# Patient Record
Sex: MALE | Race: WHITE | NOT HISPANIC OR LATINO | Employment: FULL TIME | URBAN - METROPOLITAN AREA
[De-identification: names, ages, dates, MRNs, and addresses within clinical notes are randomized per-mention and may not be internally consistent; named-entity substitution may affect disease eponyms.]

---

## 2020-07-14 ENCOUNTER — TELEPHONE (OUTPATIENT)
Dept: ORTHOPEDIC SURGERY | Facility: CLINIC | Age: 35
End: 2020-07-14

## 2020-07-16 ENCOUNTER — OFFICE VISIT (OUTPATIENT)
Dept: ORTHOPEDIC SURGERY | Facility: CLINIC | Age: 35
End: 2020-07-16

## 2020-07-16 VITALS — BODY MASS INDEX: 37.22 KG/M2 | WEIGHT: 260 LBS | TEMPERATURE: 98.7 F | HEIGHT: 70 IN

## 2020-07-16 DIAGNOSIS — S86.311A PERONEAL TENDON TEAR, RIGHT, INITIAL ENCOUNTER: ICD-10-CM

## 2020-07-16 DIAGNOSIS — M25.571 RIGHT ANKLE PAIN, UNSPECIFIED CHRONICITY: ICD-10-CM

## 2020-07-16 DIAGNOSIS — S93.401A SPRAIN OF RIGHT ANKLE, UNSPECIFIED LIGAMENT, INITIAL ENCOUNTER: Primary | ICD-10-CM

## 2020-07-16 PROCEDURE — 99244 OFF/OP CNSLTJ NEW/EST MOD 40: CPT | Performed by: ORTHOPAEDIC SURGERY

## 2020-07-16 PROCEDURE — 73610 X-RAY EXAM OF ANKLE: CPT | Performed by: ORTHOPAEDIC SURGERY

## 2020-07-16 RX ORDER — DICLOFENAC SODIUM 75 MG/1
75 TABLET, DELAYED RELEASE ORAL 2 TIMES DAILY
Qty: 60 TABLET | Refills: 1 | Status: SHIPPED | OUTPATIENT
Start: 2020-07-16 | End: 2020-08-06

## 2020-08-06 ENCOUNTER — OFFICE VISIT (OUTPATIENT)
Dept: ORTHOPEDIC SURGERY | Facility: CLINIC | Age: 35
End: 2020-08-06

## 2020-08-06 VITALS — BODY MASS INDEX: 37.28 KG/M2 | HEIGHT: 70 IN | TEMPERATURE: 97.6 F | WEIGHT: 260.4 LBS

## 2020-08-06 DIAGNOSIS — S93.401D SPRAIN OF RIGHT ANKLE, UNSPECIFIED LIGAMENT, SUBSEQUENT ENCOUNTER: ICD-10-CM

## 2020-08-06 DIAGNOSIS — S86.311D PERONEAL TENDON TEAR, RIGHT, SUBSEQUENT ENCOUNTER: Primary | ICD-10-CM

## 2020-08-06 DIAGNOSIS — M65.9 TENOSYNOVITIS OF RIGHT ANKLE: ICD-10-CM

## 2020-08-06 PROCEDURE — 99213 OFFICE O/P EST LOW 20 MIN: CPT | Performed by: ORTHOPAEDIC SURGERY

## 2020-08-27 ENCOUNTER — OFFICE VISIT (OUTPATIENT)
Dept: ORTHOPEDIC SURGERY | Facility: CLINIC | Age: 35
End: 2020-08-27

## 2020-08-27 VITALS — TEMPERATURE: 98.1 F | BODY MASS INDEX: 37.22 KG/M2 | WEIGHT: 260 LBS | HEIGHT: 70 IN

## 2020-08-27 DIAGNOSIS — S93.401D SPRAIN OF RIGHT ANKLE, UNSPECIFIED LIGAMENT, SUBSEQUENT ENCOUNTER: ICD-10-CM

## 2020-08-27 DIAGNOSIS — M65.9 TENOSYNOVITIS OF RIGHT ANKLE: ICD-10-CM

## 2020-08-27 DIAGNOSIS — S86.311D PERONEAL TENDON TEAR, RIGHT, SUBSEQUENT ENCOUNTER: Primary | ICD-10-CM

## 2020-08-27 PROCEDURE — 99213 OFFICE O/P EST LOW 20 MIN: CPT | Performed by: ORTHOPAEDIC SURGERY

## 2020-09-28 ENCOUNTER — OFFICE VISIT (OUTPATIENT)
Dept: ORTHOPEDIC SURGERY | Facility: CLINIC | Age: 35
End: 2020-09-28

## 2020-09-28 VITALS — HEIGHT: 70 IN | BODY MASS INDEX: 37.22 KG/M2 | WEIGHT: 260 LBS | TEMPERATURE: 97.5 F

## 2020-09-28 DIAGNOSIS — M65.9 TENOSYNOVITIS OF RIGHT ANKLE: ICD-10-CM

## 2020-09-28 DIAGNOSIS — S86.311D PERONEAL TENDON TEAR, RIGHT, SUBSEQUENT ENCOUNTER: Primary | ICD-10-CM

## 2020-09-28 DIAGNOSIS — S93.401D SPRAIN OF RIGHT ANKLE, UNSPECIFIED LIGAMENT, SUBSEQUENT ENCOUNTER: ICD-10-CM

## 2020-09-28 PROBLEM — M65.971 TENOSYNOVITIS OF RIGHT ANKLE: Status: ACTIVE | Noted: 2020-09-28

## 2020-09-28 PROCEDURE — 99213 OFFICE O/P EST LOW 20 MIN: CPT | Performed by: ORTHOPAEDIC SURGERY

## 2024-11-17 ENCOUNTER — HOSPITAL ENCOUNTER (EMERGENCY)
Facility: HOSPITAL | Age: 39
Discharge: HOME | End: 2024-11-17
Attending: EMERGENCY MEDICINE | Admitting: EMERGENCY MEDICINE
Payer: COMMERCIAL

## 2024-11-17 ENCOUNTER — APPOINTMENT (OUTPATIENT)
Dept: GENERAL RADIOLOGY | Facility: HOSPITAL | Age: 39
End: 2024-11-17
Payer: COMMERCIAL

## 2024-11-17 VITALS
WEIGHT: 287.26 LBS | DIASTOLIC BLOOD PRESSURE: 70 MMHG | TEMPERATURE: 98.1 F | HEART RATE: 77 BPM | HEIGHT: 70 IN | BODY MASS INDEX: 41.12 KG/M2 | OXYGEN SATURATION: 92 % | RESPIRATION RATE: 18 BRPM | SYSTOLIC BLOOD PRESSURE: 118 MMHG

## 2024-11-17 DIAGNOSIS — R07.9 NONSPECIFIC CHEST PAIN: Primary | ICD-10-CM

## 2024-11-17 DIAGNOSIS — R63.5 ABNORMAL WEIGHT GAIN: ICD-10-CM

## 2024-11-17 DIAGNOSIS — R10.13 EPIGASTRIC ABDOMINAL PAIN: ICD-10-CM

## 2024-11-17 LAB
ALBUMIN SERPL-MCNC: 4.3 G/DL (ref 3.5–5.2)
ALBUMIN/GLOB SERPL: 1.3 G/DL
ALP SERPL-CCNC: 53 U/L (ref 39–117)
ALT SERPL W P-5'-P-CCNC: 84 U/L (ref 1–41)
ANION GAP SERPL CALCULATED.3IONS-SCNC: 11.3 MMOL/L (ref 5–15)
AST SERPL-CCNC: 72 U/L (ref 1–40)
BASOPHILS # BLD AUTO: 0.05 10*3/MM3 (ref 0–0.2)
BASOPHILS NFR BLD AUTO: 0.6 % (ref 0–1.5)
BILIRUB SERPL-MCNC: 0.5 MG/DL (ref 0–1.2)
BUN SERPL-MCNC: 17 MG/DL (ref 6–20)
BUN/CREAT SERPL: 15.7 (ref 7–25)
CALCIUM SPEC-SCNC: 8.8 MG/DL (ref 8.6–10.5)
CHLORIDE SERPL-SCNC: 97 MMOL/L (ref 98–107)
CO2 SERPL-SCNC: 26.7 MMOL/L (ref 22–29)
CREAT SERPL-MCNC: 1.08 MG/DL (ref 0.76–1.27)
DEPRECATED RDW RBC AUTO: 41.3 FL (ref 37–54)
EGFRCR SERPLBLD CKD-EPI 2021: 89 ML/MIN/1.73
EOSINOPHIL # BLD AUTO: 0.07 10*3/MM3 (ref 0–0.4)
EOSINOPHIL NFR BLD AUTO: 0.8 % (ref 0.3–6.2)
ERYTHROCYTE [DISTWIDTH] IN BLOOD BY AUTOMATED COUNT: 13.4 % (ref 12.3–15.4)
FLUAV SUBTYP SPEC NAA+PROBE: NOT DETECTED
FLUBV RNA ISLT QL NAA+PROBE: NOT DETECTED
GLOBULIN UR ELPH-MCNC: 3.2 GM/DL
GLUCOSE SERPL-MCNC: 86 MG/DL (ref 65–99)
HCT VFR BLD AUTO: 44.3 % (ref 37.5–51)
HGB BLD-MCNC: 13.9 G/DL (ref 13–17.7)
HOLD SPECIMEN: NORMAL
HOLD SPECIMEN: NORMAL
IMM GRANULOCYTES # BLD AUTO: 0.1 10*3/MM3 (ref 0–0.05)
IMM GRANULOCYTES NFR BLD AUTO: 1.2 % (ref 0–0.5)
LIPASE SERPL-CCNC: 67 U/L (ref 13–60)
LYMPHOCYTES # BLD AUTO: 1.11 10*3/MM3 (ref 0.7–3.1)
LYMPHOCYTES NFR BLD AUTO: 13.2 % (ref 19.6–45.3)
MAGNESIUM SERPL-MCNC: 2.1 MG/DL (ref 1.6–2.6)
MCH RBC QN AUTO: 27 PG (ref 26.6–33)
MCHC RBC AUTO-ENTMCNC: 31.4 G/DL (ref 31.5–35.7)
MCV RBC AUTO: 86 FL (ref 79–97)
MONOCYTES # BLD AUTO: 0.42 10*3/MM3 (ref 0.1–0.9)
MONOCYTES NFR BLD AUTO: 5 % (ref 5–12)
NEUTROPHILS NFR BLD AUTO: 6.63 10*3/MM3 (ref 1.7–7)
NEUTROPHILS NFR BLD AUTO: 79.2 % (ref 42.7–76)
NRBC BLD AUTO-RTO: 0 /100 WBC (ref 0–0.2)
NT-PROBNP SERPL-MCNC: 71.4 PG/ML (ref 0–450)
PLATELET # BLD AUTO: 329 10*3/MM3 (ref 140–450)
PMV BLD AUTO: 9.7 FL (ref 6–12)
POTASSIUM SERPL-SCNC: 4.4 MMOL/L (ref 3.5–5.2)
PROT SERPL-MCNC: 7.5 G/DL (ref 6–8.5)
QT INTERVAL: 356 MS
QTC INTERVAL: 400 MS
RBC # BLD AUTO: 5.15 10*6/MM3 (ref 4.14–5.8)
RSV RNA NPH QL NAA+NON-PROBE: NOT DETECTED
SARS-COV-2 RNA RESP QL NAA+PROBE: NOT DETECTED
SODIUM SERPL-SCNC: 135 MMOL/L (ref 136–145)
TROPONIN T SERPL HS-MCNC: 15 NG/L
WBC NRBC COR # BLD AUTO: 8.38 10*3/MM3 (ref 3.4–10.8)
WHOLE BLOOD HOLD COAG: NORMAL
WHOLE BLOOD HOLD SPECIMEN: NORMAL

## 2024-11-17 PROCEDURE — 99284 EMERGENCY DEPT VISIT MOD MDM: CPT

## 2024-11-17 PROCEDURE — 93005 ELECTROCARDIOGRAM TRACING: CPT | Performed by: EMERGENCY MEDICINE

## 2024-11-17 PROCEDURE — 83735 ASSAY OF MAGNESIUM: CPT

## 2024-11-17 PROCEDURE — 80053 COMPREHEN METABOLIC PANEL: CPT

## 2024-11-17 PROCEDURE — 85025 COMPLETE CBC W/AUTO DIFF WBC: CPT

## 2024-11-17 PROCEDURE — 83690 ASSAY OF LIPASE: CPT

## 2024-11-17 PROCEDURE — 93005 ELECTROCARDIOGRAM TRACING: CPT

## 2024-11-17 PROCEDURE — 71045 X-RAY EXAM CHEST 1 VIEW: CPT

## 2024-11-17 PROCEDURE — 87637 SARSCOV2&INF A&B&RSV AMP PRB: CPT

## 2024-11-17 PROCEDURE — 83880 ASSAY OF NATRIURETIC PEPTIDE: CPT

## 2024-11-17 PROCEDURE — 84484 ASSAY OF TROPONIN QUANT: CPT

## 2024-11-17 RX ORDER — ASPIRIN 81 MG/1
324 TABLET, CHEWABLE ORAL ONCE
Status: COMPLETED | OUTPATIENT
Start: 2024-11-17 | End: 2024-11-17

## 2024-11-17 RX ORDER — SODIUM CHLORIDE 0.9 % (FLUSH) 0.9 %
10 SYRINGE (ML) INJECTION AS NEEDED
Status: DISCONTINUED | OUTPATIENT
Start: 2024-11-17 | End: 2024-11-17 | Stop reason: HOSPADM

## 2024-11-17 RX ORDER — ZIPRASIDONE HYDROCHLORIDE 80 MG/1
CAPSULE ORAL
COMMUNITY
Start: 2024-10-19

## 2024-11-17 RX ORDER — CLONIDINE HYDROCHLORIDE 0.1 MG/1
1 TABLET ORAL 3 TIMES DAILY
COMMUNITY
Start: 2024-10-30

## 2024-11-17 RX ORDER — HYDROXYZINE HYDROCHLORIDE 50 MG/1
1 TABLET, FILM COATED ORAL 3 TIMES DAILY
COMMUNITY
Start: 2024-10-27

## 2024-11-17 RX ADMIN — ASPIRIN 324 MG: 81 TABLET, CHEWABLE ORAL at 09:30

## 2024-11-17 RX ADMIN — Medication 10 ML: at 09:25

## 2024-11-17 NOTE — DISCHARGE INSTRUCTIONS
Your labs and EKG completed in the emergency department today look well and there is no acute signs of heart failure on your labs or chest x-ray today.  I have sent referral to cardiology and they should contact you Monday morning to schedule follow-up appointment however if you have not heard from them by noon I provided you their office number and location please contact them to schedule follow-up appointment as a belief you will likely need echocardiogram to further evaluate your symptoms.  Immediately turn to the emergency department for new or worsening symptoms.

## 2024-11-17 NOTE — ED PROVIDER NOTES
Time: 10:29 AM EST  Date of encounter:  11/17/2024  Independent Historian/Clinical History and Information was obtained by:   Patient    History is limited by: N/A    Chief Complaint: Chest pain      History of Present Illness:  Patient is a 38 y.o. year old male who presents to the emergency department for evaluation of chest pain.  Patient states he has had persistent chest pain and epigastric pain for the past 3 days.  Patient states that he is having shortness of breath and dyspnea on exertion that has been going on for many months.  He voices concern because he states that he has gained approximately 60 pounds over the last year and he believes this may be the cause of his symptoms.  Family at bedside states that they have been doing research on patient's medications and thought the Geodon may be causing his symptoms but he has been on this medication for 1 year as well.  Patient is denying any cough, fever.  Patient does not have any nausea, vomiting, diarrhea.  Patient has no personal history of cardiac disease or pulmonary disease but does have family history of cardiac disease.  Patient is denying any abnormal leg swelling.      Patient Care Team  Primary Care Provider: Vidhya Hendrix PA    Past Medical History:     No Known Allergies  Past Medical History:   Diagnosis Date    Anxiety     Hypertension      Past Surgical History:   Procedure Laterality Date    KNEE ACL RECONSTRUCTION Left      Family History   Problem Relation Age of Onset    Hypertension Mother        Home Medications:  Prior to Admission medications    Medication Sig Start Date End Date Taking? Authorizing Provider   cloNIDine (CATAPRES) 0.1 MG tablet Take 1 tablet by mouth 3 times a day. 10/30/24  Yes Alba Blake MD   hydrOXYzine (ATARAX) 50 MG tablet Take 1 tablet by mouth 3 times a day. 10/27/24  Yes ProviderAlba MD   ziprasidone (GEODON) 80 MG capsule TAKE 1 CAPSULE BY MOUTH AS DIRECTED IN THE EVENING 10/19/24  Yes  "Provider, Historical, MD        Social History:   Social History     Tobacco Use    Smoking status: Never    Smokeless tobacco: Never   Substance Use Topics    Alcohol use: Not Currently    Drug use: Never         Review of Systems:  Review of Systems   Constitutional:  Negative for fever.   Respiratory:  Positive for shortness of breath. Negative for cough.    Cardiovascular:  Positive for chest pain. Negative for leg swelling.   Gastrointestinal:  Positive for abdominal pain. Negative for diarrhea, nausea and vomiting.        Physical Exam:  /70 (BP Location: Left arm, Patient Position: Lying)   Pulse 77   Temp 98.1 °F (36.7 °C) (Oral)   Resp 18   Ht 177.8 cm (70\")   Wt 130 kg (287 lb 4.2 oz)   SpO2 92%   BMI 41.22 kg/m²     Physical Exam  Vitals and nursing note reviewed.   Constitutional:       General: He is not in acute distress.     Appearance: Normal appearance. He is well-developed. He is obese. He is not ill-appearing, toxic-appearing or diaphoretic.   HENT:      Head: Normocephalic and atraumatic.      Nose: Nose normal.   Eyes:      Extraocular Movements: Extraocular movements intact.      Conjunctiva/sclera: Conjunctivae normal.   Cardiovascular:      Rate and Rhythm: Normal rate and regular rhythm.      Heart sounds: Normal heart sounds.   Pulmonary:      Effort: Pulmonary effort is normal.      Breath sounds: Normal breath sounds.   Chest:      Chest wall: No mass or tenderness.   Abdominal:      General: Abdomen is flat. Bowel sounds are normal.      Palpations: Abdomen is soft. There is no mass.      Tenderness: There is abdominal tenderness (Mild epigastric tenderness). There is no guarding.   Musculoskeletal:         General: Normal range of motion.      Cervical back: Normal range of motion and neck supple.      Right lower leg: No tenderness. No edema.      Left lower leg: No tenderness. No edema.   Skin:     General: Skin is warm and dry.   Neurological:      General: No focal " deficit present.      Mental Status: He is alert and oriented to person, place, and time.   Psychiatric:         Mood and Affect: Mood normal.         Behavior: Behavior normal.         Thought Content: Thought content normal.         Judgment: Judgment normal.                Procedures:  Procedures      Medical Decision Making:    Comorbidities that affect care:    Hypertension    External Notes reviewed:    Previous Clinic Note: Patient last seen by general surgery on 6/12-24      The following orders were placed and all results were independently analyzed by me:  Orders Placed This Encounter   Procedures    COVID-19, FLU A/B, RSV PCR 1 HR TAT - Swab, Nasopharynx    XR Chest 1 View    [facility] Draw    High Sensitivity Troponin T    Comprehensive Metabolic Panel    Lipase    BNP    Magnesium    CBC Auto Differential    Ambulatory Referral to Cardiology    NPO Diet NPO Type: Strict NPO    Undress & Gown    Continuous Pulse Oximetry    Repeat vitals prior to discharge  Misc Nursing Order (Specify)    Oxygen Therapy- Nasal Cannula; Titrate 1-6 LPM Per SpO2; 90 - 95%    Insert Peripheral IV    CBC & Differential    Green Top (Gel)    Lavender Top    Gold Top - SST    Light Blue Top       Medications Given in the Emergency Department:  Medications   sodium chloride 0.9 % flush 10 mL (10 mL Intravenous Given 11/17/24 0925)   aspirin chewable tablet 324 mg (324 mg Oral Given 11/17/24 0930)        ED Course:    ED Course as of 11/17/24 1113   Sun Nov 17, 2024   1036 Heart score 1 [MD]      ED Course User Index  [MD] Edmund Martinez PA-C       Labs:    Lab Results (last 24 hours)       Procedure Component Value Units Date/Time    High Sensitivity Troponin T [[id number]]  (Normal) Collected: 11/17/24 0925    Specimen: Blood Updated: 11/17/24 1012     HS Troponin T 15 ng/L     Narrative:      High Sensitive Troponin T Reference Range:  <14.0 ng/L- Negative Female for AMI  <22.0 ng/L- Negative Male for AMI  >=14 - Abnormal  Female indicating possible myocardial injury.  >=22 - Abnormal Male indicating possible myocardial injury.   Clinicians would have to utilize clinical acumen, EKG, Troponin, and serial changes to determine if it is an Acute Myocardial Infarction or myocardial injury due to an underlying chronic condition.         CBC & Differential [[id number]]  (Abnormal) Collected: 11/17/24 0925    Specimen: Blood Updated: 11/17/24 0935    Narrative:      The following orders were created for panel order CBC & Differential.  Procedure                               Abnormality         Status                     ---------                               -----------         ------                     CBC Auto Differential[[id number]]        Abnormal            Final result                 Please view results for these tests on the individual orders.    Comprehensive Metabolic Panel [[id number]]  (Abnormal) Collected: 11/17/24 0925    Specimen: Blood Updated: 11/17/24 1012     Glucose 86 mg/dL      BUN 17 mg/dL      Creatinine 1.08 mg/dL      Sodium 135 mmol/L      Potassium 4.4 mmol/L      Chloride 97 mmol/L      CO2 26.7 mmol/L      Calcium 8.8 mg/dL      Total Protein 7.5 g/dL      Albumin 4.3 g/dL      ALT (SGPT) 84 U/L      AST (SGOT) 72 U/L      Alkaline Phosphatase 53 U/L      Total Bilirubin 0.5 mg/dL      Globulin 3.2 gm/dL      A/G Ratio 1.3 g/dL      BUN/Creatinine Ratio 15.7     Anion Gap 11.3 mmol/L      eGFR 89.0 mL/min/1.73     Narrative:      GFR Normal >60  Chronic Kidney Disease <60  Kidney Failure <15      Lipase [[id number]]  (Abnormal) Collected: 11/17/24 0925    Specimen: Blood Updated: 11/17/24 1012     Lipase 67 U/L     BNP [[id number]]  (Normal) Collected: 11/17/24 0925    Specimen: Blood Updated: 11/17/24 1008     proBNP 71.4 pg/mL     Narrative:      This assay is used as an aid in the diagnosis of individuals suspected of having heart failure. It can be used as an aid in the diagnosis of acute decompensated heart  failure (ADHF) in patients presenting with signs and symptoms of ADHF to the emergency department (ED). In addition, NT-proBNP of <300 pg/mL indicates ADHF is not likely.    Age Range Result Interpretation  NT-proBNP Concentration (pg/mL:      <50             Positive            >450                   Gray                 300-450                    Negative             <300    50-75           Positive            >900                  Gray                300-900                  Negative            <300      >75             Positive            >1800                  Gray                300-1800                  Negative            <300    Magnesium [[id number]]  (Normal) Collected: 11/17/24 0925    Specimen: Blood Updated: 11/17/24 1012     Magnesium 2.1 mg/dL     CBC Auto Differential [[id number]]  (Abnormal) Collected: 11/17/24 0925    Specimen: Blood Updated: 11/17/24 0935     WBC 8.38 10*3/mm3      RBC 5.15 10*6/mm3      Hemoglobin 13.9 g/dL      Hematocrit 44.3 %      MCV 86.0 fL      MCH 27.0 pg      MCHC 31.4 g/dL      RDW 13.4 %      RDW-SD 41.3 fl      MPV 9.7 fL      Platelets 329 10*3/mm3      Neutrophil % 79.2 %      Lymphocyte % 13.2 %      Monocyte % 5.0 %      Eosinophil % 0.8 %      Basophil % 0.6 %      Immature Grans % 1.2 %      Neutrophils, Absolute 6.63 10*3/mm3      Lymphocytes, Absolute 1.11 10*3/mm3      Monocytes, Absolute 0.42 10*3/mm3      Eosinophils, Absolute 0.07 10*3/mm3      Basophils, Absolute 0.05 10*3/mm3      Immature Grans, Absolute 0.10 10*3/mm3      nRBC 0.0 /100 WBC     COVID-19, FLU A/B, RSV PCR 1 HR TAT - Swab, Nasopharynx [[id number]]  (Normal) Collected: 11/17/24 0926    Specimen: Swab from Nasopharynx Updated: 11/17/24 1012     COVID19 Not Detected     Influenza A PCR Not Detected     Influenza B PCR Not Detected     RSV, PCR Not Detected    Narrative:      Fact sheet for providers: https://www.fda.gov/media/182123/download    Fact sheet for patients:  https://www.fda.gov/media/626396/download    Test performed by PCR.             Imaging:    XR Chest 1 View    Result Date: 11/17/2024  XR CHEST 1 VW Date of Exam: 11/17/2024 9:33 AM EST Indication: Chest Pain Triage Protocol Comparison: None available. Findings: Cardiomediastinal silhouette is within normal limits. Mildly low lung volumes. No focal consolidation or overt pulmonary edema. No pleural effusion or pneumothorax. Osseous structures are unremarkable.     Impression: No evidence of acute cardiopulmonary disease. Electronically Signed: Jeremy Ladd MD  11/17/2024 9:55 AM EST  Workstation ID: [id number]       Differential Diagnosis and Discussion:    Chest Pain:  Based on the patient's signs and symptoms, I considered aortic dissection, myocardial infaction, pulmonary embolism, cardiac tamponade, pericarditis, pneumothorax, musculoskeletal chest pain and other differential diagnosis as an etiology of the patient's chest pain.     All labs were reviewed and interpreted by me.  All X-rays impressions were independently interpreted by me.  EKG was interpreted by me.  EKG was interpreted by supervising attending.    MDM  Number of Diagnoses or Management Options  Abnormal weight gain  Epigastric abdominal pain  Nonspecific chest pain  Diagnosis management comments: Patient presented to the emergency department today for evaluation of chest pain.  CBC notes normal white blood cell count hemoglobin hematocrit.  CMP notes sodium of 137, chloride 97, mildly elevated ALT and AST which there is no comparison for.  BNP negative.  Troponin negative.  Lipase 67.  Rapid influenza, COVID, RSV testing is negative.  EKG notes normal sinus rhythm with no acute signs of ischemia.  Chest x-ray had no acute findings.  Heart score of 1.  I discussed patient's results and symptoms and will refer him to cardiology to have echocardiogram completed.  Repeat troponin and EKG were not completed in the emergency department as patient  symptoms have been present for 3 days and initial test were negative.  Patient given return to the emergency department guidelines.       Amount and/or Complexity of Data Reviewed  Clinical lab tests: reviewed and ordered  Tests in the radiology section of CPT®: reviewed and ordered  Tests in the medicine section of CPT®: reviewed    Risk of Complications, Morbidity, and/or Mortality  Presenting problems: moderate  Diagnostic procedures: low  Management options: low    Patient Progress  Patient progress: stable       Patient Care Considerations:    CT CHEST: I considered ordering a CT scan of the chest, however patient is not hypoxic or tachycardic      Consultants/Shared Management Plan:    None    Social Determinants of Health:    Patient is independent, reliable, and has access to care.       Disposition and Care Coordination:    Discharged: The patient is suitable and stable for discharge with no need for consideration of admission.    I have explained the patient´s condition, diagnoses and treatment plan based on the information available to me at this time. I have answered questions and addressed any concerns. The patient has a good  understanding of the patient´s diagnosis, condition, and treatment plan as can be expected at this point. The vital signs have been stable. The patient´s condition is stable and appropriate for discharge from the emergency department.      The patient will pursue further outpatient evaluation with the primary care physician or other designated or consulting physician as outlined in the discharge instructions. They are agreeable to this plan of care and follow-up instructions have been explained in detail. The patient has received these instructions in written format and has expressed an understanding of the discharge instructions. The patient is aware that any significant change in condition or worsening of symptoms should prompt an immediate return to this or the closest  emergency department or call to 911.  I have explained discharge medications and the need for follow up with the patient/caretakers. This was also printed in the discharge instructions. Patient was discharged with the following medications and follow up:      Medication List      No changes were made to your prescriptions during this visit.      Vidhya Hendrix PA  [address] [place] [address] [place] [id number]  [phone]          [facility] CARDIOLOGY  [address] [place] [id number] [phone]  Schedule an appointment as soon as possible for a visit          Final diagnoses:   Nonspecific chest pain   Abnormal weight gain   Epigastric abdominal pain        ED Disposition       ED Disposition   Discharge    Condition   Stable    Comment   Patient discharged home.                This medical record created using voice recognition software.             Edmund Martinez PA-C  11/17/24 1114

## 2024-11-17 NOTE — ED TRIAGE NOTES
Pt arrives to ED with complaints of chest pain.. per pt he feels like something is sitting on his chest and he can't breath good .. Pt states this has been going on for the last three days

## 2024-11-17 NOTE — Clinical Note
[facility] EMERGENCY ROOM  [address]  [place] [address]  Phone: [phone]  Fax: [phone]    Bony Solis was seen and treated in our emergency department on 11/17/2024.  He may return to work on 11/18/2024.         Thank you for choosing [facility].    Edumnd Martinez PA-C

## 2024-11-28 LAB
QT INTERVAL: 356 MS
QTC INTERVAL: 400 MS

## 2024-12-04 ENCOUNTER — TELEPHONE (OUTPATIENT)
Dept: CARDIOLOGY | Facility: CLINIC | Age: 39
End: 2024-12-04

## 2024-12-04 NOTE — TELEPHONE ENCOUNTER
Caller: RUY MURPHY    Relationship: Mother    Best call back number: [phone]    What was the call regarding: PT'S MOTHER WANTED TO KNOW WHAT ALL PAPERWORK PT WILL NEED TO FILL OUT FOR HIS NEW PT APPT. IF IT'S QUITE A BIT SHE WILL HAVE TO COME IN WITH PT TO FILL THIS OUT SO SHE WAS WONDERING IF SOMEONE COULD CALL HER AND LET HER KNOW.

## 2024-12-06 ENCOUNTER — OFFICE VISIT (OUTPATIENT)
Dept: CARDIOLOGY | Facility: CLINIC | Age: 39
End: 2024-12-06
Payer: COMMERCIAL

## 2024-12-06 VITALS
SYSTOLIC BLOOD PRESSURE: 156 MMHG | HEIGHT: 70 IN | HEART RATE: 96 BPM | BODY MASS INDEX: 41.37 KG/M2 | WEIGHT: 289 LBS | DIASTOLIC BLOOD PRESSURE: 93 MMHG

## 2024-12-06 DIAGNOSIS — R06.09 DYSPNEA ON EXERTION: ICD-10-CM

## 2024-12-06 DIAGNOSIS — R07.89 CHEST PAIN, ATYPICAL: Primary | ICD-10-CM

## 2024-12-06 DIAGNOSIS — I10 HTN (HYPERTENSION), BENIGN: ICD-10-CM

## 2024-12-06 NOTE — LETTER
December 8, 2024     PILY Ramirez  [address]  [place] [address]    Patient: Bony Solis   YOB: 1985   Date of Visit: 12/6/2024       Dear PILY Ramirez,    Bony Solis was in my office today. Below are the relevant portions of my assessment and plan of care.           If you have questions, please do not hesitate to call me. I look forward to following Bony along with you.         Sincerely,        Chantel Murray MD        CC: Edmund Martinez PA-C

## 2024-12-09 NOTE — PROGRESS NOTES
[facility]  INTERVENTIONAL CARDIOLOGY NEW PATIENT OFFICE VISIT      Chief Complaint   chest pain    Subjective          History of Present Illness    Bony Solis is a 38 y.o. male who presents to [facility] for new patient visit.       History of Present Illness  The patient presents for evaluation of chest pain.    He has been experiencing sharp, throbbing chest pain for approximately a month. The pain, described as a heavy weight on his chest, typically occurs at night and is severe enough to disrupt his sleep. He does not experience this pain during the day or while walking. He reports no chest pain today. He has no history of heart problems, and his last stress test in 2023 was normal. He reports no history of diabetes, palpitations, or fainting spells. He has never had an echocardiogram.    He reports shortness of breath when climbing stairs, which he attributes to an 80-pound weight gain. He believes this weight gain is a side effect of his schizophrenia medication, Geodon.    He admits to experiencing some anxiety and has a history of high blood pressure, for which he takes clonidine three times a day. He occasionally checks his blood pressure at home.    He also has sleep apnea but has been unable to tolerate CPAP therapy despite trying four different masks. He reports no history of acid reflux.    He went to the hospital last night due to pain in the area where his gallbladder was removed about 6 months ago. Blood work was performed, and all results were normal. He received a shot of pain medication but still experiences pain in that area.    SOCIAL HISTORY  He works at a [employer] store. He denies smoking or drinking.        Past History:  Past Medical History:   Diagnosis Date    Anxiety     Hypertension        Medical History:  Past Medical History:   Diagnosis Date    Anxiety     Hypertension        Surgical History:   has a past surgical history that includes  "Anterior cruciate ligament repair (Left).     Family History:   family history includes Hypertension in his mother.     Social History:   reports that he has never smoked. He has never used smokeless tobacco. He reports that he does not currently use alcohol. He reports that he does not use drugs.    Allergies:   Patient has no known allergies.    Current Outpatient Medications on File Prior to Visit   Medication Sig    cloNIDine (CATAPRES) 0.1 MG tablet Take 1 tablet by mouth 3 times a day.    hydrOXYzine (ATARAX) 50 MG tablet Take 1 tablet by mouth 3 times a day.    ziprasidone (GEODON) 80 MG capsule TAKE 1 CAPSULE BY MOUTH AS DIRECTED IN THE EVENING     No current facility-administered medications on file prior to visit.          Review of Systems   Negative ROS except as mentioned in HPI above.     Objective     /93   Pulse 96   Ht 177.8 cm (70\")   Wt 131 kg (289 lb)   BMI 41.47 kg/m²       Physical Exam  General : Alert, awake, no acute distress  Neck : Supple, no carotid bruit, no jugular venous distention  CVS : Regular rate and rhythm, no murmur, rubs or gallops  Lungs: Clear to auscultation bilaterally, no crackles or rhonchi  Abdomen: Soft, nontender, bowel sounds heard in all 4 quadrants  Extremities: Warm, well-perfused, no pedal edema      Result Review :     The following data was reviewed by: Chantel Murray MD on 12/06/2024:    CMP          11/17/2024    09:25   CMP   Glucose 86    BUN 17    Creatinine 1.08    EGFR 89.0    Sodium 135    Potassium 4.4    Chloride 97    Calcium 8.8    Total Protein 7.5    Albumin 4.3    Globulin 3.2    Total Bilirubin 0.5    Alkaline Phosphatase 53    AST (SGOT) 72    ALT (SGPT) 84    Albumin/Globulin Ratio 1.3    BUN/Creatinine Ratio 15.7    Anion Gap 11.3      CBC          11/17/2024    09:25   CBC   WBC 8.38    RBC 5.15    Hemoglobin 13.9    Hematocrit 44.3    MCV 86.0    MCH 27.0    MCHC 31.4    RDW 13.4    Platelets 329               Data reviewed: " Cardiology studies        No results found for this or any previous visit.          Procedures    The ASCVD Risk score (Eileen JOHNSON, et al., 2019) failed to calculate for the following reasons:    Cannot find a previous HDL lab    Cannot find a previous total cholesterol lab         Assessment and Plan      Diagnoses and all orders for this visit:    1. Chest pain, atypical (Primary)  -     Adult Transthoracic Echo Complete w/ Color, Spectral and Contrast if necessary per protocol; Future  -     Stress Test With Myocardial Perfusion One Day; Future    2. HTN (hypertension), benign  -     Adult Transthoracic Echo Complete w/ Color, Spectral and Contrast if necessary per protocol; Future  -     Stress Test With Myocardial Perfusion One Day; Future    3. Dyspnea on exertion  -     Adult Transthoracic Echo Complete w/ Color, Spectral and Contrast if necessary per protocol; Future  -     Stress Test With Myocardial Perfusion One Day; Future        Assessment & Plan  1. Chest pain.  His EKG results are normal, indicating no significant coronary artery disease. The shortness of breath he experiences is likely due to his recent weight gain. An echocardiogram will be performed to rule out any structural heart issues. A stress test with imaging will also be conducted. He has been advised to lose weight. Should any abnormalities be detected in the echocardiogram or stress test, he will be promptly informed.    2. Shortness of breath.  The shortness of breath is likely related to his recent weight gain of 80 pounds. He has been advised to lose weight and monitor his symptoms. An echocardiogram and a stress test with imaging will be conducted to rule out any significant coronary artery disease.    3. Weight gain.  The weight gain is likely due to his current medication, Geodon. He has an upcoming appointment with his psychiatry doctor to discuss switching to a different medication to manage his weight.    4. High blood  pressure.  He is currently taking clonidine three times a day for high blood pressure. He has been advised to monitor his blood pressure at home and report any significant changes.    5. Sleep apnea.  He has sleep apnea but has been unable to tolerate CPAP therapy despite trying different masks. Further evaluation and alternative treatments may be considered.    Follow-up  Return in 4 to 6 months for follow up.      Patient was educated on cardiac diet, adequate exercise and achieving/maintaining optimal weight.    Bony Solis  reports that he has never smoked. He has never used smokeless tobacco.      Follow Up     Return in about 4 months (around 4/6/2025) for Next scheduled follow up.      I spent 45 minutes caring for this patient on this date of service. This time includes time spent by me in the following activities:preparing for the visit, reviewing tests, obtaining and/or reviewing a separately obtained history, performing a medically appropriate examination and/or evaluation , counseling and educating the patient/family/caregiver, ordering medications, tests, or procedures, referring and communicating with other health care professionals , documenting information in the medical record, independently interpreting results and communicating that information with the patient/family/caregiver, and care coordination.     The patient was seen and examined. Work by the provider also included review and/or ordering of lab tests, review and/or ordering of radiology tests, review and/or ordering of medicine tests, discussion with other physicians or providers, independent review of data, obtaining old records, review/summation of old records, and/or other review.    I have reviewed the family history, social history, and past medical history for this patient. Previous information and data has been reviewed and updated as needed. I have reviewed and verified the chief complaint, history, and other documentation.  The patient was interviewed and examined in the clinic and the chart reviewed. The previous observations, recommendations, and conclusions were reviewed including those of other providers.     The plan was discussed with the patient and/or family. The patient was given time to ask questions and these questions were answered. At the conclusion of their visit they had no additional questions or concerns and all questions were answered to their satisfaction.     Patient was given instructions and counseling regarding her condition or for health maintenance advice. Please see specific information pulled into the AVS if appropriate.      Patient or patient representative verbalized consent for the use of Ambient Listening during the visit with  Chantel Murray MD for chart documentation.       Chantel Murray MD, FACC   12/08/24  22:23 EST    Dictated Utilizing Dragon Dictation

## 2025-01-08 ENCOUNTER — HOSPITAL ENCOUNTER (OUTPATIENT)
Dept: NUCLEAR MEDICINE | Facility: HOSPITAL | Age: 40
Discharge: HOME | End: 2025-01-08
Payer: COMMERCIAL

## 2025-01-08 ENCOUNTER — HOSPITAL ENCOUNTER (OUTPATIENT)
Dept: CARDIOLOGY | Facility: HOSPITAL | Age: 40
Discharge: HOME | End: 2025-01-08
Admitting: INTERNAL MEDICINE
Payer: COMMERCIAL

## 2025-01-08 DIAGNOSIS — I10 HTN (HYPERTENSION), BENIGN: ICD-10-CM

## 2025-01-08 DIAGNOSIS — R06.09 DYSPNEA ON EXERTION: ICD-10-CM

## 2025-01-08 DIAGNOSIS — R07.89 CHEST PAIN, ATYPICAL: ICD-10-CM

## 2025-01-08 LAB
AV MEAN PRESS GRAD SYS DOP V1V2: 3.2 MMHG
AV VMAX SYS DOP: 109.4 CM/SEC
BH CV ECHO MEAS - AO MAX PG: 4.8 MMHG
BH CV ECHO MEAS - AO ROOT DIAM: 3.1 CM
BH CV ECHO MEAS - AO V2 VTI: 18.4 CM
BH CV ECHO MEAS - AVA(I,D): 2.8 CM2
BH CV ECHO MEAS - EDV(MOD-SP2): 84.3 ML
BH CV ECHO MEAS - EDV(MOD-SP4): 152 ML
BH CV ECHO MEAS - EF(MOD-SP2): 40.5 %
BH CV ECHO MEAS - EF(MOD-SP4): 43.2 %
BH CV ECHO MEAS - ESV(MOD-SP2): 50.2 ML
BH CV ECHO MEAS - ESV(MOD-SP4): 86.3 ML
BH CV ECHO MEAS - IVS/LVPW: 0.92 CM
BH CV ECHO MEAS - IVSD: 1.2 CM
BH CV ECHO MEAS - LA DIMENSION: 4.6 CM
BH CV ECHO MEAS - LAT PEAK E' VEL: 7.8 CM/SEC
BH CV ECHO MEAS - LV DIASTOLIC VOL/BSA (35-75): 65.1 CM2
BH CV ECHO MEAS - LV MAX PG: 3.2 MMHG
BH CV ECHO MEAS - LV MEAN PG: 1.6 MMHG
BH CV ECHO MEAS - LV SYSTOLIC VOL/BSA (12-30): 37 CM2
BH CV ECHO MEAS - LV V1 MAX: 90 CM/SEC
BH CV ECHO MEAS - LV V1 VTI: 15.1 CM
BH CV ECHO MEAS - LVIDD: 5.2 CM
BH CV ECHO MEAS - LVIDS: 4.1 CM
BH CV ECHO MEAS - LVOT DIAM: 2.1 CM
BH CV ECHO MEAS - LVPWD: 1.3 CM
BH CV ECHO MEAS - MED PEAK E' VEL: 7.5 CM/SEC
BH CV ECHO MEAS - MV A MAX VEL: 62.2 CM/SEC
BH CV ECHO MEAS - MV DEC SLOPE: 397 CM/SEC2
BH CV ECHO MEAS - MV E MAX VEL: 67 CM/SEC
BH CV ECHO MEAS - MV E/A: 1.08
BH CV ECHO MEAS - MV MAX PG: 2.17 MMHG
BH CV ECHO MEAS - MV MEAN PG: 0.9 MMHG
BH CV ECHO MEAS - MV P1/2T: 55 MSEC
BH CV ECHO MEAS - MV V2 VTI: 17.3 CM
BH CV ECHO MEAS - MVA(P1/2T): 3.97 CM2
BH CV ECHO MEAS - RAP SYSTOLE: 3 MMHG
BH CV ECHO MEAS - RVDD: 2.5 CM
BH CV ECHO MEAS - RVSP: 26.8 MMHG
BH CV ECHO MEAS - SV(MOD-SP2): 34.1 ML
BH CV ECHO MEAS - SV(MOD-SP4): 65.7 ML
BH CV ECHO MEAS - SVI(MOD-SP2): 14.6 ML/M2
BH CV ECHO MEAS - SVI(MOD-SP4): 28.1 ML/M2
BH CV ECHO MEAS - TR MAX PG: 23.8 MMHG
BH CV ECHO MEAS - TR MAX VEL: 244 CM/SEC
BH CV ECHO MEASUREMENTS AVERAGE E/E' RATIO: 8.76
BH CV IMMEDIATE POST RECOVERY TECH DATA SYMPTOMS: NORMAL
BH CV IMMEDIATE POST TECH DATA BLOOD PRESSURE: NORMAL MMHG
BH CV IMMEDIATE POST TECH DATA HEART RATE: 145 BPM
BH CV IMMEDIATE POST TECH DATA OXYGEN SATS: 98 %
BH CV REST NUCLEAR ISOTOPE DOSE: 9.1 MCI
BH CV SIX MINUTE RECOVERY TECH DATA BLOOD PRESSURE: NORMAL
BH CV SIX MINUTE RECOVERY TECH DATA HEART RATE: 119 BPM
BH CV SIX MINUTE RECOVERY TECH DATA OXYGEN SATURATION: 98 %
BH CV STRESS BP STAGE 1: NORMAL
BH CV STRESS BP STAGE 2: NORMAL
BH CV STRESS DURATION MIN STAGE 1: 3
BH CV STRESS DURATION MIN STAGE 2: 3
BH CV STRESS DURATION MIN STAGE 3: 3
BH CV STRESS DURATION SEC STAGE 1: 0
BH CV STRESS DURATION SEC STAGE 2: 0
BH CV STRESS DURATION SEC STAGE 3: 0
BH CV STRESS GRADE STAGE 1: 10
BH CV STRESS GRADE STAGE 2: 12
BH CV STRESS GRADE STAGE 3: 14
BH CV STRESS HR STAGE 1: 120
BH CV STRESS HR STAGE 2: 152
BH CV STRESS HR STAGE 3: 152
BH CV STRESS METS STAGE 1: 5
BH CV STRESS METS STAGE 2: 7.5
BH CV STRESS METS STAGE 3: 10
BH CV STRESS NUCLEAR ISOTOPE DOSE: 31.5 MCI
BH CV STRESS O2 STAGE 1: 97
BH CV STRESS O2 STAGE 2: 98
BH CV STRESS O2 STAGE 3: 98
BH CV STRESS PROTOCOL 1: NORMAL
BH CV STRESS RECOVERY BP: NORMAL MMHG
BH CV STRESS RECOVERY HR: 119 BPM
BH CV STRESS RECOVERY O2: 98 %
BH CV STRESS SPEED STAGE 1: 1.7
BH CV STRESS SPEED STAGE 2: 2.5
BH CV STRESS SPEED STAGE 3: 3.4
BH CV STRESS STAGE 1: 1
BH CV STRESS STAGE 2: 2
BH CV STRESS STAGE 3: 3
BH CV THREE MINUTE POST TECH DATA BLOOD PRESSURE: NORMAL MMHG
BH CV THREE MINUTE POST TECH DATA HEART RATE: 130 BPM
BH CV THREE MINUTE POST TECH DATA OXYGEN SATURATION: 98 %
LEFT ATRIUM VOLUME INDEX: 32.4 ML/M2
MAXIMAL PREDICTED HEART RATE: 179 BPM
PERCENT MAX PREDICTED HR: 84.92 %
SPECT HRT GATED+EF W RNC IV: 31 %
STRESS BASELINE BP: NORMAL MMHG
STRESS BASELINE HR: 96 BPM
STRESS O2 SAT REST: 97 %
STRESS PERCENT HR: 100 %
STRESS POST ESTIMATED WORKLOAD: 7.4 METS
STRESS POST EXERCISE DUR MIN: 7 MIN
STRESS POST EXERCISE DUR SEC: 0 SEC
STRESS POST O2 SAT PEAK: 98 %
STRESS POST PEAK BP: NORMAL MMHG
STRESS POST PEAK HR: 152 BPM
STRESS TARGET HR: 152 BPM

## 2025-01-08 PROCEDURE — 34310000005 TECHNETIUM TETROFOSMIN KIT: Performed by: INTERNAL MEDICINE

## 2025-01-08 PROCEDURE — 93017 CV STRESS TEST TRACING ONLY: CPT

## 2025-01-08 PROCEDURE — 78452 HT MUSCLE IMAGE SPECT MULT: CPT

## 2025-01-08 PROCEDURE — 25010000002 SULFUR HEXAFLUORIDE MICROSPH 60.7-25 MG RECONSTITUTED SUSPENSION: Performed by: INTERNAL MEDICINE

## 2025-01-08 PROCEDURE — A9502 TC99M TETROFOSMIN: HCPCS | Performed by: INTERNAL MEDICINE

## 2025-01-08 PROCEDURE — 93306 TTE W/DOPPLER COMPLETE: CPT

## 2025-01-08 RX ADMIN — SULFUR HEXAFLUORIDE 4 ML: KIT at 08:32

## 2025-01-08 RX ADMIN — TETROFOSMIN 1 DOSE: 1.38 INJECTION, POWDER, LYOPHILIZED, FOR SOLUTION INTRAVENOUS at 10:09

## 2025-01-08 RX ADMIN — TETROFOSMIN 1 DOSE: 1.38 INJECTION, POWDER, LYOPHILIZED, FOR SOLUTION INTRAVENOUS at 08:34

## 2025-01-20 ENCOUNTER — TELEPHONE (OUTPATIENT)
Dept: CARDIOLOGY | Facility: CLINIC | Age: 40
End: 2025-01-20
Payer: COMMERCIAL

## 2025-01-20 DIAGNOSIS — I42.9 CARDIOMYOPATHY, UNSPECIFIED TYPE: Primary | ICD-10-CM

## 2025-01-20 RX ORDER — SODIUM CHLORIDE 0.9 % (FLUSH) 0.9 %
10 SYRINGE (ML) INJECTION AS NEEDED
OUTPATIENT
Start: 2025-01-20

## 2025-01-20 RX ORDER — SODIUM CHLORIDE 0.9 % (FLUSH) 0.9 %
10 SYRINGE (ML) INJECTION EVERY 12 HOURS SCHEDULED
OUTPATIENT
Start: 2025-01-20

## 2025-01-20 RX ORDER — SODIUM CHLORIDE 9 MG/ML
40 INJECTION, SOLUTION INTRAVENOUS AS NEEDED
OUTPATIENT
Start: 2025-01-20

## 2025-01-20 NOTE — TELEPHONE ENCOUNTER
----- Message from Chantel Murray sent at 1/17/2025  4:19 PM EST -----  Stress test is abnormal. Although no significant perfusion defect were seen, ejection fraction was reduced, hence a LHC would be indicating to rule out multivessel disease. If patient is agreeable, would prefer to do LHC in one of the Thursdays. Let me know if patient agreeable, will put orders.   
Orders for LHC are in. Tentatively for 1/30/25  
SW patient regarding results and recommendations. Voiced understanding.  Educated patient on heart catheterization procedure. Patient is agreeable to [facility].  
0

## 2025-01-22 ENCOUNTER — TELEPHONE (OUTPATIENT)
Dept: CARDIOLOGY | Facility: CLINIC | Age: 40
End: 2025-01-22
Payer: COMMERCIAL

## 2025-01-22 NOTE — TELEPHONE ENCOUNTER
I spoke to patient and gave an appointment on 01/30/25 for [facility]. Patient was instructed to have a  for the day of the procedure and to arrive at the [facility] registration area. Patient was educated about stent placement and informed that in the event of stent placement, the patient will be required to stay overnight for observation. Patient was instructed to continue all medications as usual. Patient was instructed to be NPO after midnight with only sips of water as needed. Patient was instructed to have labs completed on the morning of the procedure. Patient is agreeable with no other questions or concerns.

## 2025-01-28 NOTE — PRE-PROCEDURE INSTRUCTIONS
PATIENT INSTRUCTED TO BE:    - NPO AFTER MIDNIGHT EXCEPT CAN HAVE SIPS OF WATER WITH HIS MEDICATION PRIOR TO PROCEDURE    -  INSTRUCTED NO LOTIONS, JEWELRY, PIERCINGS, OR DEODORANT DAY OF THE PROCEDURE    - INSTRUCTED TO TAKE THE FOLLOWING MEDICATIONS THE DAY OF SURGERY: A.M. MEDS          - INSTRUCTED PT TO FOLLOW ANY INSTRUCTIONS GIVEN BY HIS CARDIOLOGIST.    - INFORMED PT THEY ATTEMPT RADIAL APPROACH FIRST IF UNABLE TO PERFORM CATH WITH THAT APPROACH THEY WILL DO A FEMORAL APPROACH.  ALSO, INFORMED PT THEY WILL BE ON BEDREST POSTOP.  IF THE SURGEON FEELS  AN INTERVENTION OR STENTS NEEDS TO BE PLACED, HE/SHE WILL STAY OVER NIGHT FOR OBSERVATION AND MONITORING.     - INFORMED THE PATIENT HE CAN HAVE TWO VISITORS WITH HIM THE DAY OF THE PROCEDURE    - INSTRUCTED PT TO COME TO [facility] ( [address]), CAN  PARK OR SELF PARK. ENTER THE PAVILION THRU MAIN ENTRANCE, TAKE ELEVATORS TO THE FIRST FLOOR, CHECK IN AT THE DESK FOR REGISTRATION, AFTER REGISTRATION PT WILL BE TAKEN THE THE PREOP AREA FOR HIS OR HER PROCEDURE.    -ARRIVAL TIME GIVEN BY SAME DAY SURGERY, YOU WILL RECEIVE A PHONE CALL BETWEEN 1PM AND 4PM, INFORMED PT IF ARRIVAL TIME CHANGES OR ADJUSTMENTS NEED TO BE MADE IN THEIR ARRIVAL TIME, HE/SHE WOULD RECEIVE A CALL.    -INSTRUCTED PT TO COME TO [facility] TO GET THEIR LABS/ EKG DONE PRIOR TO PROCEDURE      - PATIENT VERBALIZED UNDERSTANDING

## 2025-01-30 ENCOUNTER — HOSPITAL ENCOUNTER (OUTPATIENT)
Facility: HOSPITAL | Age: 40
Setting detail: HOSPITAL OUTPATIENT SURGERY
Discharge: HOME | End: 2025-01-30
Attending: INTERNAL MEDICINE | Admitting: INTERNAL MEDICINE
Payer: COMMERCIAL

## 2025-01-30 VITALS
HEIGHT: 70 IN | HEART RATE: 80 BPM | DIASTOLIC BLOOD PRESSURE: 92 MMHG | RESPIRATION RATE: 20 BRPM | SYSTOLIC BLOOD PRESSURE: 148 MMHG | BODY MASS INDEX: 38.35 KG/M2 | WEIGHT: 267.86 LBS | OXYGEN SATURATION: 92 % | TEMPERATURE: 98.1 F

## 2025-01-30 DIAGNOSIS — I42.9 CARDIOMYOPATHY, UNSPECIFIED TYPE: ICD-10-CM

## 2025-01-30 LAB
ANION GAP SERPL CALCULATED.3IONS-SCNC: 8.5 MMOL/L (ref 5–15)
APTT PPP: 29.1 SECONDS (ref 24.2–34.2)
BUN SERPL-MCNC: 10 MG/DL (ref 6–20)
BUN/CREAT SERPL: 10.3 (ref 7–25)
CALCIUM SPEC-SCNC: 8.9 MG/DL (ref 8.6–10.5)
CHLORIDE SERPL-SCNC: 102 MMOL/L (ref 98–107)
CO2 SERPL-SCNC: 28.5 MMOL/L (ref 22–29)
CREAT SERPL-MCNC: 0.97 MG/DL (ref 0.76–1.27)
DEPRECATED RDW RBC AUTO: 41 FL (ref 37–54)
EGFRCR SERPLBLD CKD-EPI 2021: 100.6 ML/MIN/1.73
ERYTHROCYTE [DISTWIDTH] IN BLOOD BY AUTOMATED COUNT: 14.9 % (ref 12.3–15.4)
GLUCOSE SERPL-MCNC: 101 MG/DL (ref 65–99)
HCT VFR BLD AUTO: 47.9 % (ref 37.5–51)
HGB BLD-MCNC: 15.6 G/DL (ref 13–17.7)
INR PPP: 1 (ref 0.86–1.15)
MCH RBC QN AUTO: 25.3 PG (ref 26.6–33)
MCHC RBC AUTO-ENTMCNC: 32.6 G/DL (ref 31.5–35.7)
MCV RBC AUTO: 77.6 FL (ref 79–97)
PLATELET # BLD AUTO: 280 10*3/MM3 (ref 140–450)
PMV BLD AUTO: 9.3 FL (ref 6–12)
POTASSIUM SERPL-SCNC: 4 MMOL/L (ref 3.5–5.2)
PROTHROMBIN TIME: 13.5 SECONDS (ref 11.8–14.9)
RBC # BLD AUTO: 6.17 10*6/MM3 (ref 4.14–5.8)
SODIUM SERPL-SCNC: 139 MMOL/L (ref 136–145)
WBC NRBC COR # BLD AUTO: 7.08 10*3/MM3 (ref 3.4–10.8)

## 2025-01-30 PROCEDURE — 99152 MOD SED SAME PHYS/QHP 5/>YRS: CPT | Performed by: INTERNAL MEDICINE

## 2025-01-30 PROCEDURE — 85027 COMPLETE CBC AUTOMATED: CPT | Performed by: INTERNAL MEDICINE

## 2025-01-30 PROCEDURE — C1769 GUIDE WIRE: HCPCS | Performed by: INTERNAL MEDICINE

## 2025-01-30 PROCEDURE — C1894 INTRO/SHEATH, NON-LASER: HCPCS | Performed by: INTERNAL MEDICINE

## 2025-01-30 PROCEDURE — 25010000002 FENTANYL CITRATE (PF) 50 MCG/ML SOLUTION: Performed by: INTERNAL MEDICINE

## 2025-01-30 PROCEDURE — 85730 THROMBOPLASTIN TIME PARTIAL: CPT | Performed by: INTERNAL MEDICINE

## 2025-01-30 PROCEDURE — 93458 L HRT ARTERY/VENTRICLE ANGIO: CPT | Performed by: INTERNAL MEDICINE

## 2025-01-30 PROCEDURE — 80048 BASIC METABOLIC PNL TOTAL CA: CPT | Performed by: INTERNAL MEDICINE

## 2025-01-30 PROCEDURE — 25010000002 LIDOCAINE 2% SOLUTION: Performed by: INTERNAL MEDICINE

## 2025-01-30 PROCEDURE — 25010000002 MIDAZOLAM PER 1MG: Performed by: INTERNAL MEDICINE

## 2025-01-30 PROCEDURE — S0260 H&P FOR SURGERY: HCPCS | Performed by: INTERNAL MEDICINE

## 2025-01-30 PROCEDURE — 99153 MOD SED SAME PHYS/QHP EA: CPT | Performed by: INTERNAL MEDICINE

## 2025-01-30 PROCEDURE — 85610 PROTHROMBIN TIME: CPT | Performed by: INTERNAL MEDICINE

## 2025-01-30 PROCEDURE — 25510000001 IOPAMIDOL PER 1 ML: Performed by: INTERNAL MEDICINE

## 2025-01-30 PROCEDURE — C1760 CLOSURE DEV, VASC: HCPCS | Performed by: INTERNAL MEDICINE

## 2025-01-30 PROCEDURE — 25010000002 HEPARIN (PORCINE) PER 1000 UNITS: Performed by: INTERNAL MEDICINE

## 2025-01-30 PROCEDURE — C1887 CATHETER, GUIDING: HCPCS | Performed by: INTERNAL MEDICINE

## 2025-01-30 RX ORDER — SODIUM CHLORIDE 0.9 % (FLUSH) 0.9 %
10 SYRINGE (ML) INJECTION EVERY 12 HOURS SCHEDULED
Status: DISCONTINUED | OUTPATIENT
Start: 2025-01-30 | End: 2025-01-30 | Stop reason: HOSPADM

## 2025-01-30 RX ORDER — HEPARIN SODIUM 1000 [USP'U]/ML
INJECTION, SOLUTION INTRAVENOUS; SUBCUTANEOUS
Status: DISCONTINUED | OUTPATIENT
Start: 2025-01-30 | End: 2025-01-30 | Stop reason: HOSPADM

## 2025-01-30 RX ORDER — LIDOCAINE HYDROCHLORIDE 20 MG/ML
INJECTION, SOLUTION INFILTRATION; PERINEURAL
Status: DISCONTINUED | OUTPATIENT
Start: 2025-01-30 | End: 2025-01-30 | Stop reason: HOSPADM

## 2025-01-30 RX ORDER — ACETAMINOPHEN 325 MG/1
650 TABLET ORAL EVERY 4 HOURS PRN
Status: CANCELLED | OUTPATIENT
Start: 2025-01-30

## 2025-01-30 RX ORDER — SODIUM CHLORIDE 0.9 % (FLUSH) 0.9 %
10 SYRINGE (ML) INJECTION AS NEEDED
Status: DISCONTINUED | OUTPATIENT
Start: 2025-01-30 | End: 2025-01-30 | Stop reason: HOSPADM

## 2025-01-30 RX ORDER — VERAPAMIL HYDROCHLORIDE 2.5 MG/ML
INJECTION, SOLUTION INTRAVENOUS
Status: DISCONTINUED | OUTPATIENT
Start: 2025-01-30 | End: 2025-01-30 | Stop reason: HOSPADM

## 2025-01-30 RX ORDER — NITROGLYCERIN 0.4 MG/1
0.4 TABLET SUBLINGUAL
Status: CANCELLED | OUTPATIENT
Start: 2025-01-30

## 2025-01-30 RX ORDER — FENTANYL CITRATE 50 UG/ML
INJECTION, SOLUTION INTRAMUSCULAR; INTRAVENOUS
Status: DISCONTINUED | OUTPATIENT
Start: 2025-01-30 | End: 2025-01-30 | Stop reason: HOSPADM

## 2025-01-30 RX ORDER — MIDAZOLAM HYDROCHLORIDE 2 MG/2ML
INJECTION, SOLUTION INTRAMUSCULAR; INTRAVENOUS
Status: DISCONTINUED | OUTPATIENT
Start: 2025-01-30 | End: 2025-01-30 | Stop reason: HOSPADM

## 2025-01-30 RX ORDER — SODIUM CHLORIDE 9 MG/ML
1 INJECTION, SOLUTION INTRAVENOUS CONTINUOUS
Status: CANCELLED | OUTPATIENT
Start: 2025-01-30 | End: 2025-01-30

## 2025-01-30 RX ORDER — IOPAMIDOL 755 MG/ML
INJECTION, SOLUTION INTRAVASCULAR
Status: DISCONTINUED | OUTPATIENT
Start: 2025-01-30 | End: 2025-01-30 | Stop reason: HOSPADM

## 2025-01-30 RX ORDER — SODIUM CHLORIDE 9 MG/ML
40 INJECTION, SOLUTION INTRAVENOUS AS NEEDED
Status: DISCONTINUED | OUTPATIENT
Start: 2025-01-30 | End: 2025-01-30 | Stop reason: HOSPADM

## 2025-01-30 NOTE — H&P
Patient Care Team:  Vidhya Hendrix PA as PCP - General (Family Medicine)    Chief complaint   chest pain    Subjective     History of Present Illness  Patient with chest pain who presented to the clinic initially for complaints of shortness of breath and chest pain and underwent stress test that showed ejection fraction of 31% without significant perfusion defect.  Echocardiogram showed 45 to 50% ejection fraction.  Given moderately reduced LVEF during stress, patient was brought to the Cath Lab for further evaluation.    Review of Systems   Negative except as mentioned in HPI above.      Objective      Vital Signs  Heart Rate:  [74] 74  BP: (132)/(97) 132/97    Physical Exam  General : Alert, awake, no acute distress  Neck : Supple, no carotid bruit, no jugular venous distention  CVS : Regular rate and rhythm, no murmur, rubs or gallops  Lungs: Clear to auscultation bilaterally, no crackles or rhonchi  Abdomen: Soft, nontender, bowel sounds heard in all 4 quadrants  Extremities: Warm, well-perfused, no pedal edema        Results Review:    I reviewed the patient's new clinical results.      Assessment & Plan       Cardiomyopathy      Assessment & Plan  Will proceed with left heart cath for further evaluation.    I discussed the patients findings and my recommendations with patient    Chantel Murray MD  01/30/25  10:08 EST    Time: 30 min

## 2025-01-30 NOTE — NURSING NOTE
[id number] - Secure chat sent to Dr. Murray regarding order for bed rest for four hours. Instructed to let patient get out of bed if he is doing well and femoral site is good.

## 2025-01-30 NOTE — DISCHARGE INSTRUCTIONS
DISCHARGE INSTRUCTIONS  VASCULAR  PROCEDURES  TR BAND      For your surgery you had:  IV sedation.     You may experience dizziness, drowsiness, or light-headedness for several hours following surgery/procedure.  Do not stay alone today or tonight.  Limit your activity for 24 hours.  Resume your diet slowly.  Follow whatever special dietary instructions you may have been given by your doctor.  You should not drive or operate machinery, drink alcohol, or sign legally binding documents for 24 hours or while you are taking pain medication.    NOTIFY YOUR DOCTOR IF YOU EXPERIENCE ANY OF THE FOLLOWING:  Temperature greater than 101 degrees Fahrenheit  Shaking Chills  Redness or excessive drainage from incision  Nausea, vomiting and/or pain that is not controlled by prescribed medications  Increase in bleeding or bleeding that is excessive  Unable to urinate in 6 hours after surgery  If unable to reach your doctor, please go to the closest Emergency Room     Medications per physician instructions as indicated on After Visit Summary.      Last dose of pain medication was given at 1120. TR BAND DISCHARGE INSTRUCTIONS:  For 24 hours after the procedure, or as directed by your health care provider:  Do not flex or bend the affected arm.  Do not push or pull heavy objects with the affected arm.  Do not operate machinery or power tools.  Keep affected arm elevated and rested for 48 hours.  Do not apply powder or lotion to the site.  Check your incision site every day for signs of infection. Check for:  Redness, swelling, or pain.  Fluid or blood.  Warmth.  Pus or a bad smell.  May remove bandaid: in 24 hours  Avoid manipulation of the wrist for 24 hours  After the dressing is removed, clean gently with soap and water, apply new dressing.   Avoid submerging site for one week or until healed.     FEMORAL CATH SITE CARE INSTRUCTIONS  May remove dressing: in 24 hours  No heavy lifting greater than 10-15lbs for 3 days.  You may  shower tomorrow, no submerging of incision for 2 weeks. (Pat site dry only)  Apply band-aid daily if needed for oozing or moisture, try to keep area clean and dry until scab has formed on site.  NO driving for 24 hours, avoid driving unless necessary and go slowly.  NO strenuous activity, exercise, pushing or pulling.  No heavy lifting greater than 10-15lbs. for 3 days.  Avoid stairs, if necessary go slowly.  While coughing, sneezing, or straining for bowel movement, apply pressure with palm of hand to site for 24 hours.  MD to release for work or sexual activity.

## 2025-01-30 NOTE — Clinical Note
Hemostasis started on the right femoral artery. Perclose was used in achieving hemostasis. Closure device deployed in the vessel. Hemostasis achieved successfully. Closure device additional comment: Lot # [id number]

## 2025-04-16 ENCOUNTER — OFFICE VISIT (OUTPATIENT)
Dept: CARDIOLOGY | Facility: CLINIC | Age: 40
End: 2025-04-16
Payer: COMMERCIAL

## 2025-04-16 VITALS
HEIGHT: 70 IN | HEART RATE: 79 BPM | DIASTOLIC BLOOD PRESSURE: 82 MMHG | SYSTOLIC BLOOD PRESSURE: 138 MMHG | BODY MASS INDEX: 39.4 KG/M2 | WEIGHT: 275.2 LBS

## 2025-04-16 DIAGNOSIS — Z13.1 DIABETES MELLITUS SCREENING: ICD-10-CM

## 2025-04-16 DIAGNOSIS — I10 HTN (HYPERTENSION), BENIGN: Primary | ICD-10-CM

## 2025-04-16 DIAGNOSIS — R06.09 DYSPNEA ON EXERTION: ICD-10-CM

## 2025-04-16 DIAGNOSIS — I51.89 MILD LEFT VENTRICULAR SYSTOLIC DYSFUNCTION: ICD-10-CM

## 2025-04-16 RX ORDER — SPIRONOLACTONE 25 MG/1
25 TABLET ORAL DAILY
Qty: 90 TABLET | Refills: 3 | Status: SHIPPED | OUTPATIENT
Start: 2025-04-16

## 2025-04-16 RX ORDER — ESCITALOPRAM OXALATE 20 MG/1
1 TABLET ORAL DAILY
COMMUNITY
Start: 2025-04-12

## 2025-04-16 NOTE — LETTER
April 16, 2025     PILY Ramirez  [address]  [place] [address]    Patient: Bony Solis   YOB: 1985   Date of Visit: 4/16/2025       Dear PILY Ramirez,    Bony Solis was in my office today. Below are the relevant portions of my assessment and plan of care.           If you have questions, please do not hesitate to call me. I look forward to following Bony along with you.         Sincerely,        Chantel Murray MD        CC: No Recipients

## 2025-04-16 NOTE — PROGRESS NOTES
[facility]  INTERVENTIONAL CARDIOLOGY FOLLOW-UP PROGRESS NOTE        Chief Complaint  Chest Pain and Follow-up    Subjective            History of Present Illness  Bony Solis is a 39 y.o. male who presents to [facility] CARDIOLOGY    Patient is here for follow-up visit.  Patient was initially seen for atypical chest pain and dyspnea on exertion, underwent stress test which showed no significant perfusion defect however LVEF was decreased to less than 35% on his SPECT.  Echocardiogram showed ejection fraction of 46 to 50% with mild global hypokinesis.  Left heart cath was done which showed normal coronary arteries, right dominant coronary system supplying all the way to the apex.  Patient has elevated blood pressure but is not on antihypertensives.  Patient denies chest pain at this time but endorses shortness of breath on exertion.  He works in a [employer].       Past History:  Past Medical History:   Diagnosis Date    Anxiety     Cardiomyopathy     Hypertension        Medical History:  Past Medical History:   Diagnosis Date    Anxiety     Cardiomyopathy     Hypertension        Surgical History:   has a past surgical history that includes Anterior cruciate ligament repair (Left) and Cardiac catheterization (N/A, 1/30/2025).     Family History:   family history includes Hypertension in his mother.     Social History:   reports that he has never smoked. He has never been exposed to tobacco smoke. He has never used smokeless tobacco. He reports that he does not currently use alcohol. He reports that he does not use drugs.    Allergies:   Patient has no known allergies.    Current Outpatient Medications on File Prior to Visit   Medication Sig    cloNIDine (CATAPRES) 0.1 MG tablet Take 1 tablet by mouth 3 times a day.    escitalopram (LEXAPRO) 20 MG tablet Take 1 tablet by mouth Daily.    hydrOXYzine (ATARAX) 50 MG tablet Take 1 tablet by mouth 3 times a day.    ziprasidone  "(GEODON) 80 MG capsule TAKE 1 CAPSULE BY MOUTH AS DIRECTED IN THE EVENING     No current facility-administered medications on file prior to visit.          Review of Systems   Negative except as mentioned in HPI above.    Objective     /82 (BP Location: Left arm, Patient Position: Sitting, Cuff Size: Large Adult)   Pulse 79   Ht 177.8 cm (70\")   Wt 125 kg (275 lb 3.2 oz)   BMI 39.49 kg/m²       Physical Exam    General : Alert, awake, no acute distress  Neck : Supple, no carotid bruit, no jugular venous distention  CVS : Regular rate and rhythm, no murmur, rubs or gallops  Lungs: Clear to auscultation bilaterally, no crackles or rhonchi  Abdomen: Soft, nontender, bowel sounds heard in all 4 quadrants  Extremities: Warm, well-perfused, no pedal edema    Result Review :     The following data was reviewed by: Chantel Murray MD on 04/16/2025:    CMP          11/17/2024    09:25 1/30/2025    07:17   CMP   Glucose 86  101    BUN 17  10    Creatinine 1.08  0.97    EGFR 89.0  100.6    Sodium 135  139    Potassium 4.4  4.0    Chloride 97  102    Calcium 8.8  8.9    Total Protein 7.5     Albumin 4.3     Globulin 3.2     Total Bilirubin 0.5     Alkaline Phosphatase 53     AST (SGOT) 72     ALT (SGPT) 84     Albumin/Globulin Ratio 1.3     BUN/Creatinine Ratio 15.7  10.3    Anion Gap 11.3  8.5      CBC          11/17/2024    09:25 1/30/2025    07:17   CBC   WBC 8.38  7.08    RBC 5.15  6.17    Hemoglobin 13.9  15.6    Hematocrit 44.3  47.9    MCV 86.0  77.6    MCH 27.0  25.3    MCHC 31.4  32.6    RDW 13.4  14.9    Platelets 329  280                 Data reviewed: Cardiology studies    Results for orders placed during the hospital encounter of 01/08/25    Adult Transthoracic Echo Complete w/ Color, Spectral and Contrast if necessary per protocol    Interpretation Summary    Left ventricular systolic function is mildly decreased. Left ventricular ejection fraction appears to be 46 - 50%.    Left ventricular diastolic " dysfunction is noted.    Estimated right ventricular systolic pressure from tricuspid regurgitation is normal (<35 mmHg).    Results for orders placed during the hospital encounter of 25    Stress Test With Myocardial Perfusion One Day    Interpretation Summary    Left ventricular ejection fraction is moderately reduced (Calculated EF = 31%).    Low risk for ischemic heart disease.    SPECT MPI shows no significant reversible perfusion defect, consistent with low risk study for obstructive coronary artery disease.    EKG portion of the stress test shows no significant features of ischemia. Non specific ST/T changes seen. DUke treadmill score of +7    Compared with recent echo showing LVEF of 45-50%, the SPECT LVEF is 31%. This could suggest possibly non ischemic cardiomyopathy. CCTA or LHC could be considered if needed.    Procedures  Results for orders placed during the hospital encounter of 25    Cardiac Catheterization/Vascular Study    Conclusion  [facility]  CARDIAC CATHETERIZATION PROCEDURE REPORT    Patient: Bony Solis  : 1985  MRN: [id number]    Procedure Date:  25    Referring Physician:  Self    Interventional Cardiologist:  Chantel Murray MD, [facility]    Indication:  Chest pain  Abnormal stress test    Clinical Presentation:  39-year-old male who presented with chest pain and underwent stress test that showed paradoxical defect with LVEF of 31% on stress.  Echocardiogram showed 45 to 50% LVEF.    Procedure performed:  Left heart catheterization  Selective left and right coronary angiography  Left ventriculography  Ultrasound-guided access  Moderate sedation  Access site closure with perclose device  TR band application for right radial artery access    Access Sites:  Right radial artery  Right femoral artery    Findings:  1. Coronary Artery Anatomy:  Dominance: Right coronary  Left Main: No significant stenosis  Left Anterior Descending:  No significant  stenosis  Circumflex Artery:  No significant stenosis  Right Coronary Artery:   No significant stenosis, large vessel that supplies up to the apex of the heart.    2. Hemodynamics:  Left Ventricle:  LVEDP: 15 mmHg  Aorta: 112/93 mmHg    3. Left Ventriculogram:  Ejection Fraction: 50-55%  Wall Motion: No significant  Mitral Regurgitation: No significant      Procedure Details:  Informed consent was obtained with an explanation of the risks, benefits and alternatives of the procedure. The patient was brought to the Cardiac Catheterization Laboratory and was prepped and draped in a standard sterile fashion. Moderate sedation with Fentanyl and Versed was administered by the circulating nurse.    First had right radial artery access and we were able to obtain right coronary artery diagnostic pictures using Tiger catheter.  We were unable to obtain left coronary artery pictures using the Tiger catheter or other catheters through right radial artery due to heavy tortuosity of the subclavian artery into the aorta.  Wishes to right femoral artery and a JL 4 catheter to obtain the left coronary artery pictures.    We then exchanged for an angled pigtail catheter and enter the left ventricle to measure hemodynamics and perform a left ventriculogram.  The catheter was then removed over a guidewire. The femoral artery sheath was removed without difficulty and perclose device was placed over the access site with excellent hemostasis.    Patient tolerated the procedure well without any complications, and transferred to the post procedure area for recovery in a stable condition.      Complications:  None.    Estimated Blood Loss:  Minimal.    Conclusions:  No significant coronary disease  Normal LVEDP  Low normal LVEF of 50 to 55%    Recommendations:  Aggressive risk factor modification  Rule out noncardiac causes of chest pain  Patient may be discharged after 4 hours of bedrest        Status  Elective    Electronically signed by  Chantel Murray MD, 01/30/25, 12:49 PM EST.        ASCVD Score  The ASCVD Risk score (Eileen DK, et al., 2019) failed to calculate for the following reasons:    Cannot find a previous HDL lab    Cannot find a previous total cholesterol lab         Assessment and Plan          Diagnoses and all orders for this visit:    1. HTN (hypertension), benign (Primary)    2. Mild left ventricular systolic dysfunction    3. Dyspnea on exertion    4. Diabetes mellitus screening  -     Lipid Panel; Future  -     Hemoglobin A1c; Future    Other orders  -     empagliflozin (Jardiance) 10 MG tablet tablet; Take 1 tablet by mouth Daily.  Dispense: 90 tablet; Refill: 3  -     spironolactone (ALDACTONE) 25 MG tablet; Take 1 tablet by mouth Daily.  Dispense: 90 tablet; Refill: 3          Plan  - Patient with mildly reduced LVEF with nonischemic cardiomyopathy.  Will start patient on Jardiance 10 mg daily and spironolactone 25 mg daily.  - Patient suggested to lose weight, control blood pressure, diet and exercise.  - Patient was introduced to ALEXEI trial, which she is interested to learning more about.  Will get my research team to reach out to them.  - Will check lipid panel and A1c.    Will follow-up in 6 months or sooner if needed.      Patient was educated on heart healthy diet, daily exercise and achieving optimal weight.     Follow Up     Return in about 6 months (around 10/16/2025) for Next scheduled follow up.      Bony Solis  reports that he has never smoked. He has never been exposed to tobacco smoke. He has never used smokeless tobacco.       I spent 30 minutes caring for this patient on this date of service. This time includes time spent by me in the following activities:preparing for the visit, reviewing tests, obtaining and/or reviewing a separately obtained history, performing a medically appropriate examination and/or evaluation , counseling and educating the patient/family/caregiver, ordering medications, tests,  or procedures, referring and communicating with other health care professionals , documenting information in the medical record, independently interpreting results and communicating that information with the patient/family/caregiver, and care coordination.    I have reviewed the family history, social history, and past medical history for this patient. Previous information and data has been reviewed and updated as needed. I have reviewed and verified the chief complaint, history, and other documentation. The patient was interviewed and examined in the clinic and the chart reviewed. The previous observations, recommendations, and conclusions were reviewed including those of other providers.     The plan was discussed with the patient and/or family. The patient was given time to ask questions and these questions were answered. At the conclusion of their visit they had no additional questions or concerns and all questions were answered to their satisfaction.     Patient was given instructions and counseling regarding her condition or for health maintenance advice. Please see specific information pulled into the AVS if appropriate.      Chantel Murray MD, FACC  04/16/25  09:43 EDT    Dictated Utilizing Dragon Dictation

## 2025-04-18 ENCOUNTER — RESEARCH ENCOUNTER (OUTPATIENT)
Dept: OTHER | Facility: OTHER | Age: 40
End: 2025-04-18
Payer: COMMERCIAL

## 2025-04-18 NOTE — RESEARCH
Patient Name:  Bony Solis  YOB: 1985  Patient Age:  39 y.o.  Patient's Sex:  male    Date of Service:  04/18/2025    Provider: Dr. Murray                     RESEARCH NOTE                  After discussion with Dr. Murray,  called the patient to discuss the [employer] [id number] ALEXEI: Effects of Ziltivekimab Versus Placebo on Morbidity and Mortality in Patients With Heart Failure With Mildly Reduced or Preserved Ejection Fraction and Systemic Inflammation study. The consent form was emailed to the patient and summarized by the . The patient expressed interest in the study, but is unsure if he can comply with the research visit schedule, due to his work schedule and the frequency of the research visits (once a month for first 3 months, then every 3 months thereafter for up to 4 years).  provided the patient with her contact information, and informed him to reach out if he has any further questions, or if he wishes to screen for the study again. Patient was thanked for his time and interest in this study.     OTILIA Elizalde.

## (undated) DEVICE — SI AVANTI+ 6F STD W/GW  NO OBT: Brand: AVANTI

## (undated) DEVICE — CATH GUIDE LAUNCHER EBU3.5 6F 100CM

## (undated) DEVICE — CATH F5INF TLPIGST145 110CM6SH: Brand: INFINITI

## (undated) DEVICE — GW FC FLOP/TP .035 260CM 3MM

## (undated) DEVICE — PERCLOSE™ PROSTYLE™ SUTURE-MEDIATED CLOSURE AND REPAIR SYSTEM: Brand: PERCLOSE™ PROSTYLE™

## (undated) DEVICE — RADIFOCUS GLIDEWIRE: Brand: GLIDEWIRE

## (undated) DEVICE — CATH F6 ST JL 4 100CM: Brand: SUPERTORQUE

## (undated) DEVICE — GLIDESHEATH SLENDER ACCESS KIT: Brand: GLIDESHEATH SLENDER

## (undated) DEVICE — RADIFOCUS OPTITORQUE ANGIOGRAPHIC CATHETER: Brand: OPTITORQUE

## (undated) DEVICE — TR BAND RADIAL ARTERY COMPRESSION DEVICE: Brand: TR BAND